# Patient Record
Sex: FEMALE | Race: WHITE | URBAN - METROPOLITAN AREA
[De-identification: names, ages, dates, MRNs, and addresses within clinical notes are randomized per-mention and may not be internally consistent; named-entity substitution may affect disease eponyms.]

---

## 2017-07-12 ENCOUNTER — FOLLOW UP (OUTPATIENT)
Dept: URBAN - METROPOLITAN AREA CLINIC 26 | Facility: CLINIC | Age: 77
End: 2017-07-12

## 2017-07-12 VITALS
HEIGHT: 66 IN | BODY MASS INDEX: 29.73 KG/M2 | WEIGHT: 185 LBS | SYSTOLIC BLOOD PRESSURE: 131 MMHG | HEART RATE: 69 BPM | DIASTOLIC BLOOD PRESSURE: 79 MMHG

## 2017-07-12 DIAGNOSIS — H35.373: ICD-10-CM

## 2017-07-12 DIAGNOSIS — H35.3211: ICD-10-CM

## 2017-07-12 DIAGNOSIS — H43.813: ICD-10-CM

## 2017-07-12 DIAGNOSIS — H35.3122: ICD-10-CM

## 2017-07-12 PROCEDURE — 92250 FUNDUS PHOTOGRAPHY W/I&R: CPT

## 2017-07-12 PROCEDURE — 92014 COMPRE OPH EXAM EST PT 1/>: CPT

## 2017-07-12 PROCEDURE — 92134 CPTRZ OPH DX IMG PST SGM RTA: CPT

## 2017-07-12 PROCEDURE — 92235 FLUORESCEIN ANGRPH MLTIFRAME: CPT

## 2017-07-12 ASSESSMENT — VISUAL ACUITY
OD_SC: 20/25-1
OS_SC: 20/40+2

## 2017-07-12 ASSESSMENT — TONOMETRY
OD_IOP_MMHG: 13
OS_IOP_MMHG: 13

## 2018-01-17 ENCOUNTER — FOLLOW UP (OUTPATIENT)
Dept: URBAN - METROPOLITAN AREA CLINIC 26 | Facility: CLINIC | Age: 78
End: 2018-01-17

## 2018-01-17 VITALS
WEIGHT: 180 LBS | BODY MASS INDEX: 29.99 KG/M2 | DIASTOLIC BLOOD PRESSURE: 89 MMHG | HEART RATE: 80 BPM | HEIGHT: 65 IN | SYSTOLIC BLOOD PRESSURE: 133 MMHG

## 2018-01-17 DIAGNOSIS — H02.833: ICD-10-CM

## 2018-01-17 DIAGNOSIS — H43.813: ICD-10-CM

## 2018-01-17 DIAGNOSIS — H35.373: ICD-10-CM

## 2018-01-17 DIAGNOSIS — H26.493: ICD-10-CM

## 2018-01-17 DIAGNOSIS — H35.3231: ICD-10-CM

## 2018-01-17 DIAGNOSIS — H35.3122: ICD-10-CM

## 2018-01-17 DIAGNOSIS — H02.836: ICD-10-CM

## 2018-01-17 DIAGNOSIS — H04.123: ICD-10-CM

## 2018-01-17 PROCEDURE — 92250 FUNDUS PHOTOGRAPHY W/I&R: CPT

## 2018-01-17 PROCEDURE — 67220 TREATMENT OF CHOROID LESION: CPT

## 2018-01-17 PROCEDURE — 92235 FLUORESCEIN ANGRPH MLTIFRAME: CPT

## 2018-01-17 PROCEDURE — 92014 COMPRE OPH EXAM EST PT 1/>: CPT

## 2018-01-17 ASSESSMENT — TONOMETRY
OD_IOP_MMHG: 12
OS_IOP_MMHG: 12

## 2018-01-17 ASSESSMENT — VISUAL ACUITY
OD_CC: 20/50-
OS_PH: 20/25-
OD_PH: 20/20-
OS_CC: 20/60-

## 2018-01-22 ENCOUNTER — CLINIC PROCEDURE ONLY (OUTPATIENT)
Dept: URBAN - METROPOLITAN AREA CLINIC 26 | Facility: CLINIC | Age: 78
End: 2018-01-22

## 2018-01-22 DIAGNOSIS — H35.3231: ICD-10-CM

## 2018-01-22 PROCEDURE — 67028 INJECTION EYE DRUG: CPT

## 2018-01-22 ASSESSMENT — TONOMETRY: OS_IOP_MMHG: 14

## 2018-01-22 ASSESSMENT — VISUAL ACUITY: OS_CC: 20/60-

## 2018-03-05 ENCOUNTER — CLINIC PROCEDURE ONLY (OUTPATIENT)
Dept: URBAN - METROPOLITAN AREA CLINIC 26 | Facility: CLINIC | Age: 78
End: 2018-03-05

## 2018-03-05 DIAGNOSIS — H35.3231: ICD-10-CM

## 2018-03-05 PROCEDURE — 67028 INJECTION EYE DRUG: CPT

## 2018-03-05 ASSESSMENT — VISUAL ACUITY: OS_SC: 20/30-1

## 2018-03-05 ASSESSMENT — TONOMETRY: OS_IOP_MMHG: 14

## 2018-03-27 ENCOUNTER — IMPORTED ENCOUNTER (OUTPATIENT)
Dept: URBAN - METROPOLITAN AREA CLINIC 31 | Facility: CLINIC | Age: 78
End: 2018-03-27

## 2018-03-27 PROBLEM — H35.3231: Noted: 2018-03-27

## 2018-03-27 PROBLEM — H26.493: Noted: 2018-03-27

## 2018-03-27 PROBLEM — H26.492: Noted: 2018-03-27

## 2018-03-27 PROBLEM — Z96.1: Noted: 2018-03-27

## 2018-03-27 PROBLEM — H26.491: Noted: 2018-03-27

## 2018-03-27 PROCEDURE — 99204 OFFICE O/P NEW MOD 45 MIN: CPT

## 2018-04-23 ENCOUNTER — FOLLOW UP AND POST INJECTION EVALUATION (OUTPATIENT)
Dept: URBAN - METROPOLITAN AREA CLINIC 26 | Facility: CLINIC | Age: 78
End: 2018-04-23

## 2018-04-23 VITALS — DIASTOLIC BLOOD PRESSURE: 80 MMHG | HEART RATE: 68 BPM | HEIGHT: 55 IN | SYSTOLIC BLOOD PRESSURE: 120 MMHG

## 2018-04-23 DIAGNOSIS — H35.3231: ICD-10-CM

## 2018-04-23 DIAGNOSIS — H02.836: ICD-10-CM

## 2018-04-23 DIAGNOSIS — H43.813: ICD-10-CM

## 2018-04-23 DIAGNOSIS — H26.491: ICD-10-CM

## 2018-04-23 DIAGNOSIS — H04.123: ICD-10-CM

## 2018-04-23 DIAGNOSIS — H02.833: ICD-10-CM

## 2018-04-23 DIAGNOSIS — H35.373: ICD-10-CM

## 2018-04-23 PROCEDURE — 92235 FLUORESCEIN ANGRPH MLTIFRAME: CPT

## 2018-04-23 PROCEDURE — 92250 FUNDUS PHOTOGRAPHY W/I&R: CPT

## 2018-04-23 PROCEDURE — 92134 CPTRZ OPH DX IMG PST SGM RTA: CPT

## 2018-04-23 PROCEDURE — 92014 COMPRE OPH EXAM EST PT 1/>: CPT

## 2018-04-23 ASSESSMENT — TONOMETRY
OS_IOP_MMHG: 13
OD_IOP_MMHG: 11

## 2018-04-23 ASSESSMENT — VISUAL ACUITY
OS_CC: 20/40+2
OD_CC: 20/30+1

## 2018-07-23 ENCOUNTER — FOLLOW UP (OUTPATIENT)
Dept: URBAN - METROPOLITAN AREA CLINIC 26 | Facility: CLINIC | Age: 78
End: 2018-07-23

## 2018-07-23 VITALS — HEIGHT: 55 IN | HEART RATE: 68 BPM | SYSTOLIC BLOOD PRESSURE: 140 MMHG | DIASTOLIC BLOOD PRESSURE: 92 MMHG

## 2018-07-23 DIAGNOSIS — H35.373: ICD-10-CM

## 2018-07-23 DIAGNOSIS — H35.3231: ICD-10-CM

## 2018-07-23 DIAGNOSIS — H43.813: ICD-10-CM

## 2018-07-23 PROCEDURE — 92134 CPTRZ OPH DX IMG PST SGM RTA: CPT

## 2018-07-23 PROCEDURE — 92014 COMPRE OPH EXAM EST PT 1/>: CPT

## 2018-07-23 PROCEDURE — 92235 FLUORESCEIN ANGRPH MLTIFRAME: CPT

## 2018-07-23 PROCEDURE — 92250 FUNDUS PHOTOGRAPHY W/I&R: CPT

## 2018-07-23 ASSESSMENT — TONOMETRY
OD_IOP_MMHG: 12
OS_IOP_MMHG: 13

## 2018-07-23 ASSESSMENT — VISUAL ACUITY
OD_SC: 20/25-2
OS_SC: 20/30+1

## 2018-10-22 ENCOUNTER — FOLLOW UP (OUTPATIENT)
Dept: URBAN - METROPOLITAN AREA CLINIC 26 | Facility: CLINIC | Age: 78
End: 2018-10-22

## 2018-10-22 VITALS — SYSTOLIC BLOOD PRESSURE: 112 MMHG | DIASTOLIC BLOOD PRESSURE: 68 MMHG | HEART RATE: 72 BPM | HEIGHT: 55 IN

## 2018-10-22 DIAGNOSIS — H35.373: ICD-10-CM

## 2018-10-22 DIAGNOSIS — H43.813: ICD-10-CM

## 2018-10-22 DIAGNOSIS — H35.3231: ICD-10-CM

## 2018-10-22 PROCEDURE — 92014 COMPRE OPH EXAM EST PT 1/>: CPT

## 2018-10-22 PROCEDURE — 92235 FLUORESCEIN ANGRPH MLTIFRAME: CPT

## 2018-10-22 PROCEDURE — 92250 FUNDUS PHOTOGRAPHY W/I&R: CPT

## 2018-10-22 PROCEDURE — 92134 CPTRZ OPH DX IMG PST SGM RTA: CPT

## 2018-10-22 ASSESSMENT — VISUAL ACUITY
OD_SC: 20/25-2
OS_SC: 20/30-1

## 2018-10-22 ASSESSMENT — TONOMETRY
OS_IOP_MMHG: 11
OD_IOP_MMHG: 10

## 2019-01-21 ENCOUNTER — FOLLOW UP (OUTPATIENT)
Dept: URBAN - METROPOLITAN AREA CLINIC 26 | Facility: CLINIC | Age: 79
End: 2019-01-21

## 2019-01-21 VITALS
BODY MASS INDEX: 29.82 KG/M2 | HEART RATE: 74 BPM | DIASTOLIC BLOOD PRESSURE: 77 MMHG | WEIGHT: 179 LBS | SYSTOLIC BLOOD PRESSURE: 109 MMHG | HEIGHT: 65 IN

## 2019-01-21 DIAGNOSIS — H26.491: ICD-10-CM

## 2019-01-21 DIAGNOSIS — H35.3231: ICD-10-CM

## 2019-01-21 DIAGNOSIS — H35.373: ICD-10-CM

## 2019-01-21 DIAGNOSIS — H43.813: ICD-10-CM

## 2019-01-21 PROCEDURE — 92250 FUNDUS PHOTOGRAPHY W/I&R: CPT

## 2019-01-21 PROCEDURE — 92014 COMPRE OPH EXAM EST PT 1/>: CPT

## 2019-01-21 PROCEDURE — 92134 CPTRZ OPH DX IMG PST SGM RTA: CPT

## 2019-01-21 PROCEDURE — 92235 FLUORESCEIN ANGRPH MLTIFRAME: CPT

## 2019-01-21 ASSESSMENT — TONOMETRY
OD_IOP_MMHG: 12
OS_IOP_MMHG: 13

## 2019-01-21 ASSESSMENT — VISUAL ACUITY
OS_SC: 20/30-2
OD_SC: 20/25+2

## 2019-07-16 ENCOUNTER — FOLLOW UP (OUTPATIENT)
Dept: URBAN - METROPOLITAN AREA CLINIC 26 | Facility: CLINIC | Age: 79
End: 2019-07-16

## 2019-07-16 VITALS — SYSTOLIC BLOOD PRESSURE: 135 MMHG | DIASTOLIC BLOOD PRESSURE: 81 MMHG | HEART RATE: 75 BPM | HEIGHT: 55 IN

## 2019-07-16 DIAGNOSIS — H35.3231: ICD-10-CM

## 2019-07-16 DIAGNOSIS — H02.833: ICD-10-CM

## 2019-07-16 DIAGNOSIS — H02.836: ICD-10-CM

## 2019-07-16 DIAGNOSIS — H04.123: ICD-10-CM

## 2019-07-16 DIAGNOSIS — H43.813: ICD-10-CM

## 2019-07-16 DIAGNOSIS — H35.373: ICD-10-CM

## 2019-07-16 DIAGNOSIS — H26.491: ICD-10-CM

## 2019-07-16 PROCEDURE — 67220 TREATMENT OF CHOROID LESION: CPT

## 2019-07-16 PROCEDURE — 92014 COMPRE OPH EXAM EST PT 1/>: CPT

## 2019-07-16 PROCEDURE — 92250 FUNDUS PHOTOGRAPHY W/I&R: CPT

## 2019-07-16 PROCEDURE — 92235 FLUORESCEIN ANGRPH MLTIFRAME: CPT

## 2019-07-16 PROCEDURE — 92134 CPTRZ OPH DX IMG PST SGM RTA: CPT

## 2019-07-16 ASSESSMENT — TONOMETRY
OD_IOP_MMHG: 13
OS_IOP_MMHG: 11

## 2019-07-16 ASSESSMENT — VISUAL ACUITY
OS_SC: 20/30-2
OD_SC: 20/20-3

## 2019-07-18 ENCOUNTER — CLINIC PROCEDURE ONLY (OUTPATIENT)
Dept: URBAN - METROPOLITAN AREA CLINIC 26 | Facility: CLINIC | Age: 79
End: 2019-07-18

## 2019-07-18 DIAGNOSIS — H35.3231: ICD-10-CM

## 2019-07-18 PROCEDURE — 67028 INJECTION EYE DRUG: CPT

## 2019-07-18 ASSESSMENT — VISUAL ACUITY: OD_SC: 20/25+1

## 2019-07-18 ASSESSMENT — TONOMETRY: OD_IOP_MMHG: 12

## 2019-08-29 ENCOUNTER — CLINICAL PROCEDURE AND DIAGNOSTIC TESTING ONLY (OUTPATIENT)
Dept: URBAN - METROPOLITAN AREA CLINIC 26 | Facility: CLINIC | Age: 79
End: 2019-08-29

## 2019-08-29 DIAGNOSIS — H35.3231: ICD-10-CM

## 2019-08-29 PROCEDURE — 67028 INJECTION EYE DRUG: CPT

## 2019-08-29 PROCEDURE — 92250 FUNDUS PHOTOGRAPHY W/I&R: CPT

## 2019-08-29 ASSESSMENT — VISUAL ACUITY: OD_SC: 20/25-2

## 2019-08-29 ASSESSMENT — TONOMETRY: OD_IOP_MMHG: 11

## 2019-10-24 ENCOUNTER — FOLLOW UP AND POST INJECTION EVALUATION (OUTPATIENT)
Dept: URBAN - METROPOLITAN AREA CLINIC 26 | Facility: CLINIC | Age: 79
End: 2019-10-24

## 2019-10-24 VITALS — SYSTOLIC BLOOD PRESSURE: 128 MMHG | DIASTOLIC BLOOD PRESSURE: 80 MMHG | HEART RATE: 65 BPM | HEIGHT: 55 IN

## 2019-10-24 DIAGNOSIS — H02.836: ICD-10-CM

## 2019-10-24 DIAGNOSIS — H26.491: ICD-10-CM

## 2019-10-24 DIAGNOSIS — H02.833: ICD-10-CM

## 2019-10-24 DIAGNOSIS — H35.373: ICD-10-CM

## 2019-10-24 DIAGNOSIS — H43.813: ICD-10-CM

## 2019-10-24 DIAGNOSIS — H04.123: ICD-10-CM

## 2019-10-24 DIAGNOSIS — H35.3231: ICD-10-CM

## 2019-10-24 PROCEDURE — 92250 FUNDUS PHOTOGRAPHY W/I&R: CPT

## 2019-10-24 PROCEDURE — 92134 CPTRZ OPH DX IMG PST SGM RTA: CPT

## 2019-10-24 PROCEDURE — 92235 FLUORESCEIN ANGRPH MLTIFRAME: CPT

## 2019-10-24 PROCEDURE — 92014 COMPRE OPH EXAM EST PT 1/>: CPT

## 2019-10-24 ASSESSMENT — VISUAL ACUITY
OS_SC: 20/30-2
OD_SC: 20/25-2

## 2019-10-24 ASSESSMENT — TONOMETRY
OS_IOP_MMHG: 11
OD_IOP_MMHG: 12

## 2020-03-05 ENCOUNTER — FOLLOW UP AND POST INJECTION EVALUATION (OUTPATIENT)
Dept: URBAN - METROPOLITAN AREA CLINIC 26 | Facility: CLINIC | Age: 80
End: 2020-03-05

## 2020-03-05 VITALS
WEIGHT: 163 LBS | SYSTOLIC BLOOD PRESSURE: 138 MMHG | DIASTOLIC BLOOD PRESSURE: 70 MMHG | BODY MASS INDEX: 27.16 KG/M2 | HEIGHT: 65 IN

## 2020-03-05 DIAGNOSIS — H26.491: ICD-10-CM

## 2020-03-05 DIAGNOSIS — H35.3231: ICD-10-CM

## 2020-03-05 DIAGNOSIS — H43.813: ICD-10-CM

## 2020-03-05 DIAGNOSIS — H35.373: ICD-10-CM

## 2020-03-05 PROCEDURE — 92014 COMPRE OPH EXAM EST PT 1/>: CPT

## 2020-03-05 PROCEDURE — 92134 CPTRZ OPH DX IMG PST SGM RTA: CPT

## 2020-03-05 PROCEDURE — 92235 FLUORESCEIN ANGRPH MLTIFRAME: CPT

## 2020-03-05 PROCEDURE — 92250 FUNDUS PHOTOGRAPHY W/I&R: CPT

## 2020-03-05 ASSESSMENT — VISUAL ACUITY
OS_CC: 20/15-
OD_CC: 20/25

## 2020-03-05 ASSESSMENT — TONOMETRY
OS_IOP_MMHG: 12
OD_IOP_MMHG: 11

## 2020-06-03 ENCOUNTER — FOLLOW UP (OUTPATIENT)
Dept: URBAN - METROPOLITAN AREA CLINIC 26 | Facility: CLINIC | Age: 80
End: 2020-06-03

## 2020-06-03 VITALS — HEART RATE: 66 BPM | DIASTOLIC BLOOD PRESSURE: 86 MMHG | SYSTOLIC BLOOD PRESSURE: 125 MMHG | HEIGHT: 55 IN

## 2020-06-03 DIAGNOSIS — H35.3231: ICD-10-CM

## 2020-06-03 DIAGNOSIS — H43.813: ICD-10-CM

## 2020-06-03 DIAGNOSIS — H02.836: ICD-10-CM

## 2020-06-03 DIAGNOSIS — H02.833: ICD-10-CM

## 2020-06-03 DIAGNOSIS — H04.123: ICD-10-CM

## 2020-06-03 DIAGNOSIS — H26.491: ICD-10-CM

## 2020-06-03 DIAGNOSIS — H35.373: ICD-10-CM

## 2020-06-03 PROCEDURE — 92235 FLUORESCEIN ANGRPH MLTIFRAME: CPT

## 2020-06-03 PROCEDURE — 92014 COMPRE OPH EXAM EST PT 1/>: CPT

## 2020-06-03 PROCEDURE — 92134 CPTRZ OPH DX IMG PST SGM RTA: CPT

## 2020-06-03 PROCEDURE — 92250 FUNDUS PHOTOGRAPHY W/I&R: CPT

## 2020-06-03 ASSESSMENT — VISUAL ACUITY
OS_CC: 20/25+2
OD_CC: 20/25

## 2020-06-03 ASSESSMENT — TONOMETRY
OS_IOP_MMHG: 13
OD_IOP_MMHG: 13

## 2020-09-01 ENCOUNTER — OFFICE VISIT (OUTPATIENT)
Age: 80
End: 2020-09-01

## 2021-01-19 ENCOUNTER — FOLLOW UP (OUTPATIENT)
Dept: URBAN - METROPOLITAN AREA CLINIC 26 | Facility: CLINIC | Age: 81
End: 2021-01-19

## 2021-01-19 VITALS
SYSTOLIC BLOOD PRESSURE: 156 MMHG | HEART RATE: 71 BPM | DIASTOLIC BLOOD PRESSURE: 85 MMHG | HEIGHT: 64 IN | WEIGHT: 172 LBS | BODY MASS INDEX: 29.37 KG/M2

## 2021-01-19 DIAGNOSIS — H35.373: ICD-10-CM

## 2021-01-19 DIAGNOSIS — H43.813: ICD-10-CM

## 2021-01-19 DIAGNOSIS — H35.3231: ICD-10-CM

## 2021-01-19 PROCEDURE — 92235 FLUORESCEIN ANGRPH MLTIFRAME: CPT

## 2021-01-19 PROCEDURE — 92250 FUNDUS PHOTOGRAPHY W/I&R: CPT

## 2021-01-19 PROCEDURE — 92134 CPTRZ OPH DX IMG PST SGM RTA: CPT

## 2021-01-19 PROCEDURE — 92014 COMPRE OPH EXAM EST PT 1/>: CPT

## 2021-01-19 ASSESSMENT — VISUAL ACUITY
OS_SC: 20/20-1
OD_SC: 20/25+1

## 2021-01-19 ASSESSMENT — TONOMETRY
OS_IOP_MMHG: 13
OD_IOP_MMHG: 14

## 2021-02-17 ENCOUNTER — TELEPHONE ENCOUNTER (OUTPATIENT)
Dept: URBAN - METROPOLITAN AREA CLINIC 9 | Facility: CLINIC | Age: 81
End: 2021-02-17

## 2021-02-18 ENCOUNTER — OFFICE VISIT (OUTPATIENT)
Dept: URBAN - METROPOLITAN AREA CLINIC 7 | Facility: CLINIC | Age: 81
End: 2021-02-18

## 2021-03-05 ENCOUNTER — OFFICE VISIT (OUTPATIENT)
Dept: URBAN - METROPOLITAN AREA SURGERY CENTER 5 | Facility: SURGERY CENTER | Age: 81
End: 2021-03-05

## 2021-04-12 ENCOUNTER — TELEPHONE ENCOUNTER (OUTPATIENT)
Dept: URBAN - METROPOLITAN AREA CLINIC 9 | Facility: CLINIC | Age: 81
End: 2021-04-12

## 2021-11-04 ENCOUNTER — FOLLOW UP (OUTPATIENT)
Dept: URBAN - METROPOLITAN AREA CLINIC 26 | Facility: CLINIC | Age: 81
End: 2021-11-04

## 2021-11-04 VITALS — WEIGHT: 175 LBS | HEIGHT: 65 IN | BODY MASS INDEX: 29.16 KG/M2

## 2021-11-04 DIAGNOSIS — H04.123: ICD-10-CM

## 2021-11-04 DIAGNOSIS — H43.813: ICD-10-CM

## 2021-11-04 DIAGNOSIS — H35.373: ICD-10-CM

## 2021-11-04 DIAGNOSIS — H35.3211: ICD-10-CM

## 2021-11-04 DIAGNOSIS — H35.3221: ICD-10-CM

## 2021-11-04 PROCEDURE — 92250 FUNDUS PHOTOGRAPHY W/I&R: CPT

## 2021-11-04 PROCEDURE — 92014 COMPRE OPH EXAM EST PT 1/>: CPT

## 2021-11-04 PROCEDURE — 92134 CPTRZ OPH DX IMG PST SGM RTA: CPT

## 2021-11-04 ASSESSMENT — TONOMETRY
OD_IOP_MMHG: 13
OS_IOP_MMHG: 14

## 2021-11-04 ASSESSMENT — VISUAL ACUITY
OD_CC: 20/20+2
OS_CC: 20/20

## 2022-01-14 ENCOUNTER — IMPORTED ENCOUNTER (OUTPATIENT)
Dept: URBAN - METROPOLITAN AREA CLINIC 31 | Facility: CLINIC | Age: 82
End: 2022-01-14

## 2022-01-14 PROBLEM — Z96.1: Noted: 2022-01-14

## 2022-01-14 PROBLEM — H26.491: Noted: 2022-01-14

## 2022-01-14 PROBLEM — H35.3231: Noted: 2022-01-14

## 2022-01-14 PROCEDURE — 99214 OFFICE O/P EST MOD 30 MIN: CPT

## 2022-04-02 ASSESSMENT — TONOMETRY
OD_IOP_MMHG: 13
OD_IOP_MMHG: 14
OS_IOP_MMHG: 15
OS_IOP_MMHG: 14

## 2022-04-02 ASSESSMENT — VISUAL ACUITY
OD_SC: 20/40-1
OS_CC: 20/40-2
OS_PH: SC 20/30 -1
OD_CC: 20/30-2
OU_SC: 20/5016''
OD_GLARE: 20/40MED
OS_SC: 20/25
OS_GLARE: 20/80MED

## 2022-06-07 ENCOUNTER — FOLLOW UP (OUTPATIENT)
Dept: URBAN - METROPOLITAN AREA CLINIC 26 | Facility: CLINIC | Age: 82
End: 2022-06-07

## 2022-06-07 VITALS — BODY MASS INDEX: 28.99 KG/M2 | HEIGHT: 65 IN | WEIGHT: 174 LBS

## 2022-06-07 DIAGNOSIS — H04.123: ICD-10-CM

## 2022-06-07 DIAGNOSIS — H35.373: ICD-10-CM

## 2022-06-07 DIAGNOSIS — H43.813: ICD-10-CM

## 2022-06-07 DIAGNOSIS — H35.3231: ICD-10-CM

## 2022-06-07 PROCEDURE — 92134 CPTRZ OPH DX IMG PST SGM RTA: CPT

## 2022-06-07 PROCEDURE — 92250 FUNDUS PHOTOGRAPHY W/I&R: CPT

## 2022-06-07 PROCEDURE — 92014 COMPRE OPH EXAM EST PT 1/>: CPT

## 2022-06-07 ASSESSMENT — VISUAL ACUITY
OS_CC: 20/20
OD_CC: 20/25+2

## 2022-06-07 ASSESSMENT — TONOMETRY
OS_IOP_MMHG: 14
OD_IOP_MMHG: 13

## 2022-07-30 ENCOUNTER — TELEPHONE ENCOUNTER (OUTPATIENT)
Age: 82
End: 2022-07-30

## 2022-07-30 RX ORDER — SIMVASTATIN 40 MG/1
1 (ONE) TABLET, FILM COATED ORAL
Qty: 0 | Refills: 2 | OUTPATIENT
Start: 2019-04-08 | End: 2019-04-08

## 2022-07-31 ENCOUNTER — TELEPHONE ENCOUNTER (OUTPATIENT)
Age: 82
End: 2022-07-31

## 2023-01-27 ENCOUNTER — FOLLOW UP (OUTPATIENT)
Dept: URBAN - METROPOLITAN AREA CLINIC 26 | Facility: CLINIC | Age: 83
End: 2023-01-27

## 2023-01-27 DIAGNOSIS — H35.3231: ICD-10-CM

## 2023-01-27 DIAGNOSIS — H04.123: ICD-10-CM

## 2023-01-27 DIAGNOSIS — H35.373: ICD-10-CM

## 2023-01-27 DIAGNOSIS — H43.813: ICD-10-CM

## 2023-01-27 PROCEDURE — 67220 TREATMENT OF CHOROID LESION: CPT

## 2023-01-27 PROCEDURE — 92134 CPTRZ OPH DX IMG PST SGM RTA: CPT

## 2023-01-27 PROCEDURE — 92014 COMPRE OPH EXAM EST PT 1/>: CPT

## 2023-01-27 PROCEDURE — 92250 FUNDUS PHOTOGRAPHY W/I&R: CPT

## 2023-01-27 ASSESSMENT — VISUAL ACUITY
OD_CC: 20/40-1
OS_CC: 20/20-1

## 2023-01-27 ASSESSMENT — TONOMETRY
OS_IOP_MMHG: 12
OD_IOP_MMHG: 14

## 2023-04-04 ENCOUNTER — CLINIC PROCEDURE ONLY (OUTPATIENT)
Dept: URBAN - METROPOLITAN AREA CLINIC 26 | Facility: CLINIC | Age: 83
End: 2023-04-04

## 2023-04-04 DIAGNOSIS — H35.373: ICD-10-CM

## 2023-04-04 DIAGNOSIS — H35.3231: ICD-10-CM

## 2023-04-04 PROCEDURE — 67028 INJECTION EYE DRUG: CPT

## 2023-04-04 PROCEDURE — 92250 FUNDUS PHOTOGRAPHY W/I&R: CPT

## 2023-04-04 PROCEDURE — 92134 CPTRZ OPH DX IMG PST SGM RTA: CPT

## 2023-04-04 ASSESSMENT — TONOMETRY: OS_IOP_MMHG: 13

## 2023-05-30 ENCOUNTER — CLINIC PROCEDURE ONLY (OUTPATIENT)
Dept: URBAN - METROPOLITAN AREA CLINIC 26 | Facility: CLINIC | Age: 83
End: 2023-05-30

## 2023-05-30 DIAGNOSIS — H35.3231: ICD-10-CM

## 2023-05-30 PROCEDURE — 67028 INJECTION EYE DRUG: CPT

## 2023-05-30 PROCEDURE — 92250 FUNDUS PHOTOGRAPHY W/I&R: CPT

## 2023-05-30 PROCEDURE — 92134 CPTRZ OPH DX IMG PST SGM RTA: CPT

## 2023-05-30 ASSESSMENT — TONOMETRY: OS_IOP_MMHG: 14

## 2023-08-11 ENCOUNTER — COMPREHENSIVE EXAM (OUTPATIENT)
Dept: URBAN - METROPOLITAN AREA CLINIC 26 | Facility: CLINIC | Age: 83
End: 2023-08-11

## 2023-08-11 VITALS — BODY MASS INDEX: 29.37 KG/M2 | WEIGHT: 172 LBS | HEIGHT: 64 IN

## 2023-08-11 DIAGNOSIS — H43.813: ICD-10-CM

## 2023-08-11 DIAGNOSIS — H35.3231: ICD-10-CM

## 2023-08-11 DIAGNOSIS — H35.373: ICD-10-CM

## 2023-08-11 DIAGNOSIS — H04.123: ICD-10-CM

## 2023-08-11 PROCEDURE — 92134 CPTRZ OPH DX IMG PST SGM RTA: CPT

## 2023-08-11 PROCEDURE — 92250 FUNDUS PHOTOGRAPHY W/I&R: CPT

## 2023-08-11 PROCEDURE — 92014 COMPRE OPH EXAM EST PT 1/>: CPT

## 2023-08-11 ASSESSMENT — TONOMETRY
OD_IOP_MMHG: 12
OS_IOP_MMHG: 13

## 2023-08-11 ASSESSMENT — VISUAL ACUITY
OD_CC: 20/25-2
OS_CC: 20/20-1

## 2023-11-14 ENCOUNTER — FOLLOW UP (OUTPATIENT)
Dept: URBAN - METROPOLITAN AREA CLINIC 26 | Facility: CLINIC | Age: 83
End: 2023-11-14

## 2023-11-14 DIAGNOSIS — H35.3231: ICD-10-CM

## 2023-11-14 DIAGNOSIS — H04.123: ICD-10-CM

## 2023-11-14 DIAGNOSIS — H43.813: ICD-10-CM

## 2023-11-14 DIAGNOSIS — H35.373: ICD-10-CM

## 2023-11-14 PROCEDURE — 92134 CPTRZ OPH DX IMG PST SGM RTA: CPT

## 2023-11-14 PROCEDURE — 92014 COMPRE OPH EXAM EST PT 1/>: CPT

## 2023-11-14 PROCEDURE — 92250 FUNDUS PHOTOGRAPHY W/I&R: CPT

## 2023-11-14 ASSESSMENT — TONOMETRY
OD_IOP_MMHG: 14
OS_IOP_MMHG: 15

## 2023-11-14 ASSESSMENT — VISUAL ACUITY
OD_CC: 20/25-1
OS_CC: 20/20

## 2023-12-20 ENCOUNTER — TELEPHONE ENCOUNTER (OUTPATIENT)
Dept: URBAN - METROPOLITAN AREA CLINIC 7 | Facility: CLINIC | Age: 83
End: 2023-12-20

## 2024-02-14 ENCOUNTER — FOLLOW UP (OUTPATIENT)
Dept: URBAN - METROPOLITAN AREA CLINIC 26 | Facility: CLINIC | Age: 84
End: 2024-02-14

## 2024-02-14 VITALS — HEIGHT: 65 IN | WEIGHT: 174 LBS | BODY MASS INDEX: 28.99 KG/M2

## 2024-02-14 DIAGNOSIS — H02.833: ICD-10-CM

## 2024-02-14 DIAGNOSIS — H02.836: ICD-10-CM

## 2024-02-14 DIAGNOSIS — H43.813: ICD-10-CM

## 2024-02-14 DIAGNOSIS — H35.3231: ICD-10-CM

## 2024-02-14 DIAGNOSIS — H04.123: ICD-10-CM

## 2024-02-14 DIAGNOSIS — H35.373: ICD-10-CM

## 2024-02-14 PROCEDURE — 92134 CPTRZ OPH DX IMG PST SGM RTA: CPT

## 2024-02-14 PROCEDURE — 92012 INTRM OPH EXAM EST PATIENT: CPT

## 2024-02-14 ASSESSMENT — TONOMETRY
OS_IOP_MMHG: 14
OD_IOP_MMHG: 14

## 2024-02-14 ASSESSMENT — VISUAL ACUITY
OS_CC: 20/25
OD_CC: 20/30-2

## 2024-02-27 ENCOUNTER — OV EP (OUTPATIENT)
Dept: URBAN - METROPOLITAN AREA CLINIC 7 | Facility: CLINIC | Age: 84
End: 2024-02-27
Payer: MEDICARE

## 2024-02-27 ENCOUNTER — LAB (OUTPATIENT)
Dept: URBAN - METROPOLITAN AREA CLINIC 7 | Facility: CLINIC | Age: 84
End: 2024-02-27

## 2024-02-27 VITALS
WEIGHT: 180 LBS | HEIGHT: 65 IN | SYSTOLIC BLOOD PRESSURE: 142 MMHG | DIASTOLIC BLOOD PRESSURE: 86 MMHG | TEMPERATURE: 98 F | BODY MASS INDEX: 29.99 KG/M2

## 2024-02-27 DIAGNOSIS — I10 HYPERTENSION: ICD-10-CM

## 2024-02-27 DIAGNOSIS — E78.5 HYPERLIPIDEMIA: ICD-10-CM

## 2024-02-27 DIAGNOSIS — Z80.0 FAMILY HISTORY OF COLON CANCER: ICD-10-CM

## 2024-02-27 DIAGNOSIS — Z86.010 HX OF COLONIC POLYPS: ICD-10-CM

## 2024-02-27 PROBLEM — 428283002: Status: ACTIVE | Noted: 2024-02-27

## 2024-02-27 PROCEDURE — 99214 OFFICE O/P EST MOD 30 MIN: CPT | Performed by: INTERNAL MEDICINE

## 2024-02-27 RX ORDER — ROSUVASTATIN CALCIUM 10 MG/1
TAKE 1 TABLET BY MOUTH DAILY TABLET, FILM COATED ORAL
Qty: 90 EACH | Refills: 0 | Status: ACTIVE | COMMUNITY

## 2024-02-27 RX ORDER — AMOXICILLIN 500 MG/1
TAKE 4 CAPSULES BY MOUTH 1 HOUR BEFORE PROCEDURE FOR 5 DAYS CAPSULE ORAL
Qty: 20 EACH | Refills: 0 | Status: ON HOLD | COMMUNITY

## 2024-02-27 RX ORDER — TELMISARTAN 40 MG/1
TAKE 1 TABLET BY MOUTH DAILY TABLET ORAL
Qty: 90 EACH | Refills: 3 | Status: ACTIVE | COMMUNITY

## 2024-02-27 NOTE — HPI-TODAY'S VISIT:
Hx polyps and family hx CRC in mother. No cardiopulmonary comorbidities . No anticoagulation. No recent nsaid use history. No fevers or chills No nausea or vomiting No weight loss No change in bowel habits but reports frequent bowel habits. No bleeding.

## 2024-04-04 ENCOUNTER — COLON (OUTPATIENT)
Dept: URBAN - METROPOLITAN AREA SURGERY CENTER 5 | Facility: SURGERY CENTER | Age: 84
End: 2024-04-04

## 2024-05-03 ENCOUNTER — TELEPHONE ENCOUNTER (OUTPATIENT)
Dept: URBAN - METROPOLITAN AREA CLINIC 7 | Facility: CLINIC | Age: 84
End: 2024-05-03

## 2024-05-03 ENCOUNTER — LAB OUTSIDE AN ENCOUNTER (OUTPATIENT)
Dept: URBAN - METROPOLITAN AREA CLINIC 7 | Facility: CLINIC | Age: 84
End: 2024-05-03

## 2024-11-04 ENCOUNTER — COMPREHENSIVE EXAM (OUTPATIENT)
Dept: URBAN - METROPOLITAN AREA CLINIC 26 | Facility: CLINIC | Age: 84
End: 2024-11-04

## 2024-11-04 DIAGNOSIS — H04.123: ICD-10-CM

## 2024-11-04 DIAGNOSIS — H43.813: ICD-10-CM

## 2024-11-04 DIAGNOSIS — H35.3231: ICD-10-CM

## 2024-11-04 DIAGNOSIS — H35.373: ICD-10-CM

## 2024-11-04 PROCEDURE — 92250 FUNDUS PHOTOGRAPHY W/I&R: CPT | Mod: 59

## 2024-11-04 PROCEDURE — 92014 COMPRE OPH EXAM EST PT 1/>: CPT

## 2024-11-04 PROCEDURE — 92134 CPTRZ OPH DX IMG PST SGM RTA: CPT

## 2024-12-30 ENCOUNTER — TELEPHONE ENCOUNTER (OUTPATIENT)
Dept: URBAN - METROPOLITAN AREA CLINIC 7 | Facility: CLINIC | Age: 84
End: 2024-12-30

## 2025-05-05 ENCOUNTER — COMPREHENSIVE EXAM (OUTPATIENT)
Age: 85
End: 2025-05-05

## 2025-05-05 VITALS — BODY MASS INDEX: 28.51 KG/M2 | WEIGHT: 167 LBS | HEIGHT: 64 IN

## 2025-05-05 DIAGNOSIS — H35.373: ICD-10-CM

## 2025-05-05 DIAGNOSIS — H43.813: ICD-10-CM

## 2025-05-05 DIAGNOSIS — H04.123: ICD-10-CM

## 2025-05-05 DIAGNOSIS — H35.3231: ICD-10-CM

## 2025-05-05 PROCEDURE — 92250 FUNDUS PHOTOGRAPHY W/I&R: CPT | Mod: 59

## 2025-05-05 PROCEDURE — 92134 CPTRZ OPH DX IMG PST SGM RTA: CPT

## 2025-05-05 PROCEDURE — 92014 COMPRE OPH EXAM EST PT 1/>: CPT
